# Patient Record
Sex: MALE | Race: WHITE
[De-identification: names, ages, dates, MRNs, and addresses within clinical notes are randomized per-mention and may not be internally consistent; named-entity substitution may affect disease eponyms.]

---

## 2018-07-16 ENCOUNTER — TRANSCRIPTION ENCOUNTER (OUTPATIENT)
Age: 48
End: 2018-07-16

## 2018-12-18 PROBLEM — Z00.00 ENCOUNTER FOR PREVENTIVE HEALTH EXAMINATION: Status: ACTIVE | Noted: 2018-12-18

## 2018-12-19 ENCOUNTER — APPOINTMENT (OUTPATIENT)
Dept: GASTROENTEROLOGY | Facility: CLINIC | Age: 48
End: 2018-12-19
Payer: COMMERCIAL

## 2018-12-19 VITALS
WEIGHT: 175 LBS | SYSTOLIC BLOOD PRESSURE: 118 MMHG | HEIGHT: 69 IN | BODY MASS INDEX: 25.92 KG/M2 | DIASTOLIC BLOOD PRESSURE: 64 MMHG | HEART RATE: 70 BPM

## 2018-12-19 DIAGNOSIS — Z83.79 FAMILY HISTORY OF OTHER DISEASES OF THE DIGESTIVE SYSTEM: ICD-10-CM

## 2018-12-19 DIAGNOSIS — Z87.891 PERSONAL HISTORY OF NICOTINE DEPENDENCE: ICD-10-CM

## 2018-12-19 PROCEDURE — 99214 OFFICE O/P EST MOD 30 MIN: CPT

## 2018-12-19 RX ORDER — OMEPRAZOLE 20 MG/1
20 TABLET, DELAYED RELEASE ORAL
Refills: 0 | Status: ACTIVE | COMMUNITY

## 2019-02-27 ENCOUNTER — INBOUND DOCUMENT (OUTPATIENT)
Age: 49
End: 2019-02-27

## 2019-03-07 ENCOUNTER — RECORD ABSTRACTING (OUTPATIENT)
Age: 49
End: 2019-03-07

## 2019-03-07 DIAGNOSIS — Z80.8 FAMILY HISTORY OF MALIGNANT NEOPLASM OF OTHER ORGANS OR SYSTEMS: ICD-10-CM

## 2019-03-07 DIAGNOSIS — L27.0 GENERALIZED SKIN ERUPTION DUE TO DRUGS AND MEDICAMENTS TAKEN INTERNALLY: ICD-10-CM

## 2019-03-07 DIAGNOSIS — Z79.899 OTHER LONG TERM (CURRENT) DRUG THERAPY: ICD-10-CM

## 2019-03-07 DIAGNOSIS — I82.629 ACUTE EMBOLISM AND THROMBOSIS OF DEEP VEINS OF UNSPECIFIED UPPER EXTREMITY: ICD-10-CM

## 2019-03-07 RX ORDER — OMEGA-3/DHA/EPA/FISH OIL 300-1000MG
1000 CAPSULE ORAL
Refills: 0 | Status: ACTIVE | COMMUNITY

## 2019-03-21 ENCOUNTER — APPOINTMENT (OUTPATIENT)
Dept: GASTROENTEROLOGY | Facility: HOSPITAL | Age: 49
End: 2019-03-21

## 2019-03-23 ENCOUNTER — RX RENEWAL (OUTPATIENT)
Age: 49
End: 2019-03-23

## 2019-07-01 ENCOUNTER — RX RENEWAL (OUTPATIENT)
Age: 49
End: 2019-07-01

## 2020-03-30 ENCOUNTER — RX RENEWAL (OUTPATIENT)
Age: 50
End: 2020-03-30

## 2020-12-07 ENCOUNTER — TRANSCRIPTION ENCOUNTER (OUTPATIENT)
Age: 50
End: 2020-12-07

## 2021-03-05 ENCOUNTER — TRANSCRIPTION ENCOUNTER (OUTPATIENT)
Age: 51
End: 2021-03-05

## 2021-04-15 ENCOUNTER — TRANSCRIPTION ENCOUNTER (OUTPATIENT)
Age: 51
End: 2021-04-15

## 2021-04-15 ENCOUNTER — RX RENEWAL (OUTPATIENT)
Age: 51
End: 2021-04-15

## 2021-05-10 ENCOUNTER — NON-APPOINTMENT (OUTPATIENT)
Age: 51
End: 2021-05-10

## 2021-05-10 ENCOUNTER — APPOINTMENT (OUTPATIENT)
Dept: GASTROENTEROLOGY | Facility: CLINIC | Age: 51
End: 2021-05-10
Payer: COMMERCIAL

## 2021-05-10 VITALS
HEART RATE: 61 BPM | TEMPERATURE: 96.8 F | DIASTOLIC BLOOD PRESSURE: 62 MMHG | WEIGHT: 173 LBS | HEIGHT: 69 IN | SYSTOLIC BLOOD PRESSURE: 104 MMHG | BODY MASS INDEX: 25.62 KG/M2

## 2021-05-10 PROCEDURE — 99072 ADDL SUPL MATRL&STAF TM PHE: CPT

## 2021-05-10 PROCEDURE — 99214 OFFICE O/P EST MOD 30 MIN: CPT | Mod: 25

## 2021-05-10 PROCEDURE — 36415 COLL VENOUS BLD VENIPUNCTURE: CPT

## 2021-05-10 RX ORDER — MESALAMINE 4 G/60ML
4 SUSPENSION RECTAL DAILY
Qty: 30 | Refills: 5 | Status: DISCONTINUED | COMMUNITY
Start: 2018-12-19 | End: 2021-05-10

## 2021-05-10 RX ORDER — OMEGA-3/DHA/EPA/FISH OIL 300-1000MG
CAPSULE ORAL
Refills: 0 | Status: ACTIVE | COMMUNITY

## 2021-05-10 RX ORDER — OMEPRAZOLE 40 MG/1
40 CAPSULE, DELAYED RELEASE ORAL
Refills: 0 | Status: DISCONTINUED | COMMUNITY
End: 2021-05-10

## 2021-05-10 RX ORDER — ASCORBIC ACID 500 MG
TABLET ORAL
Refills: 0 | Status: ACTIVE | COMMUNITY

## 2021-05-10 RX ORDER — FAMOTIDINE 40 MG/1
TABLET, FILM COATED ORAL
Refills: 0 | Status: DISCONTINUED | COMMUNITY
End: 2021-05-10

## 2021-05-10 RX ORDER — CHOLECALCIFEROL (VITAMIN D3) 25 MCG
TABLET ORAL
Refills: 0 | Status: ACTIVE | COMMUNITY

## 2021-05-10 RX ORDER — HYDROCORTISONE 100 MG/60ML
100 ENEMA RECTAL
Refills: 0 | Status: DISCONTINUED | COMMUNITY
End: 2021-05-10

## 2021-05-10 NOTE — ASSESSMENT
[FreeTextEntry1] : 1.  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 We are doing of full lab work now.\par 5. I am asking him also to followup with Dr. Pankaj Butler\par #6 The patienthas only been on omeprazole for his reflux currently\par 7. He has a history of being on 6-MP for 7 years and did very well in no 7 years and has done fairly well since that time. Currently he is on no medications for his Crohn's8. Upwe will set up a colonoscopy and EGD hopefully within the next 3 or 4 weeks\par 9.  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  THis coordination of care may have included reviewing other medical notes and reports, and communicating with other health professionals\par \par AS WE OBTAIN INFORMED CONSENT FOR COLONOSCOPY, UPPER ENDOSCOPY [EGD], OR BOTH PROCEDURES TOGETHER;\par \par As with all procedures, there are risks of which the patient should be aware\par \par 1.  Anesthesia; deep sedation with Propofol;  there is a small risk of aspiration and pulmonary infection.  The Anesthesiologist meets with the patient the morning of the procedure to discuss in more detail\par \par 2.  risk of bleeding; from removal of a polyp, or rarely, from biopsies, 1 % or less\par \par 3.  risk of injury or perforation of the colon or upper GI tract; one in a thousand or less,  from removing a polyp or from advancing the instrument\par \par \par

## 2021-05-10 NOTE — PHYSICAL EXAM
[General Appearance - Alert] : alert [General Appearance - In No Acute Distress] : in no acute distress [Sclera] : the sclera and conjunctiva were normal [PERRL With Normal Accommodation] : pupils were equal in size, round, and reactive to light [Extraocular Movements] : extraocular movements were intact [Heart Rate And Rhythm] : heart rate was normal and rhythm regular [Heart Sounds] : normal S1 and S2 [Heart Sounds Gallop] : no gallops [Murmurs] : no murmurs [Heart Sounds Pericardial Friction Rub] : no pericardial rub [Full Pulse] : the pedal pulses are present [Edema] : there was no peripheral edema [Bowel Sounds] : normal bowel sounds [Abdomen Soft] : soft [Abdomen Tenderness] : non-tender [] : no hepato-splenomegaly [Abdomen Mass (___ Cm)] : no abdominal mass palpated [FreeTextEntry1] : deferred to colonoscopy [Oriented To Time, Place, And Person] : oriented to person, place, and time [Impaired Insight] : insight and judgment were intact [Affect] : the affect was normal

## 2021-05-10 NOTE — HISTORY OF PRESENT ILLNESS
[FreeTextEntry1] : \par In the officeappointment,established patient,Crohn's diseaselarge and small intestine\par First visi since approximately March 2019. At that time he had colonoscopy.\par \par There is long-standing Crohn's, and patient took 6 mercaptopurine for 7 years from [id number] 2017 along with Pentasa.\par \par He had excellent control of his Crohn's while he was on6 mercaptopurine\par he had poor control for the years prior to treatment with 6-MP and he required prednisone on several occasions.\par We have found that the biopsies on several occasions showed more inflammationthan did his Crohn's disease of symptoms which indicated reasonably good control.\par \par Even now he says that his bowel movements tend to be normal and under control and his GI symptoms are under control except when he deviates from diet, and seems to be sensitive particularly to leg wounds, Prevacid, dairy products which can give him cramps gas and diarrhea even the next day\par He has considerable esophageal reflux, and is on long-term omeprazole with excellent control, but prompt return of his reflux symptoms should he go off the omeprazoleeven for oneday.\par On his last colonoscopy March 22, 2019 he had inflammation in the rectum, terminal ileum was normal, chronic minimal changes in the cecum, suffusion of the vascular pattern andscarring end ofsome atrophy in the ascending colon, a 1.2 cm sessile polyp in the ascending colon which was removed but proved to be a pseudopolyp, and another pseudopolyp that was removed as well thinking it could be adenomatous.\par \par There was considerable scarring in the transverse colon tethering of the mucosa and loss of submucosal vascular pattern. Chronic disease in the transverse colon but not much acute disease if any\par \par Areas of scarring in the rectum and the sigmoid was normal. Chronic changes in the descending colon.\par \par Therefore he hassome pseudopolyps in the left colon, considerable chronic changes on the colon on his last colonoscopy, and 2 polyps which were removed which ultimately proved to be pseudopolyps.\par \par He has not had EGD of probably for 5 years\par \par In March 2018, when he was having more considerable inflammation, I needed to treat him with prednisone for a short period of time and tapered quickly.\par \par That was for a flareup of symptoms\par Please note also a history in 2014 of a thrombosis of the left subclavian vein which was treated with 6 months of colic was very he has not had any recurrent symptoms of thrombosis and I wonder if this was due to the hypervascularity of Crohn's disease. Again this has not occurred since that time.

## 2021-05-11 LAB
25(OH)D3 SERPL-MCNC: 35.5 NG/ML
ALBUMIN SERPL ELPH-MCNC: 4.4 G/DL
ALP BLD-CCNC: 71 U/L
ALT SERPL-CCNC: 23 U/L
ANION GAP SERPL CALC-SCNC: 10 MMOL/L
AST SERPL-CCNC: 21 U/L
BASOPHILS # BLD AUTO: 0.04 K/UL
BASOPHILS NFR BLD AUTO: 0.9 %
BILIRUB SERPL-MCNC: 0.2 MG/DL
BUN SERPL-MCNC: 20 MG/DL
CALCIUM SERPL-MCNC: 9.1 MG/DL
CHLORIDE SERPL-SCNC: 103 MMOL/L
CO2 SERPL-SCNC: 28 MMOL/L
CREAT SERPL-MCNC: 1.09 MG/DL
CRP SERPL-MCNC: <3 MG/L
EOSINOPHIL # BLD AUTO: 0.29 K/UL
EOSINOPHIL NFR BLD AUTO: 6.5 %
ERYTHROCYTE [SEDIMENTATION RATE] IN BLOOD BY WESTERGREN METHOD: 9 MM/HR
GLUCOSE SERPL-MCNC: 100 MG/DL
HCT VFR BLD CALC: 43 %
HGB BLD-MCNC: 14.2 G/DL
IMM GRANULOCYTES NFR BLD AUTO: 0.2 %
LYMPHOCYTES # BLD AUTO: 1.42 K/UL
LYMPHOCYTES NFR BLD AUTO: 31.7 %
MAN DIFF?: NORMAL
MCHC RBC-ENTMCNC: 31.3 PG
MCHC RBC-ENTMCNC: 33 GM/DL
MCV RBC AUTO: 94.7 FL
MONOCYTES # BLD AUTO: 0.42 K/UL
MONOCYTES NFR BLD AUTO: 9.4 %
NEUTROPHILS # BLD AUTO: 2.3 K/UL
NEUTROPHILS NFR BLD AUTO: 51.3 %
PLATELET # BLD AUTO: 235 K/UL
POTASSIUM SERPL-SCNC: 4.6 MMOL/L
PROT SERPL-MCNC: 6.4 G/DL
RBC # BLD: 4.54 M/UL
RBC # FLD: 12.4 %
SODIUM SERPL-SCNC: 140 MMOL/L
VIT B12 SERPL-MCNC: 905 PG/ML
WBC # FLD AUTO: 4.48 K/UL

## 2021-05-16 ENCOUNTER — RX RENEWAL (OUTPATIENT)
Age: 51
End: 2021-05-16

## 2021-06-09 ENCOUNTER — RX RENEWAL (OUTPATIENT)
Age: 51
End: 2021-06-09

## 2021-06-09 RX ORDER — OMEPRAZOLE 20 MG/1
20 CAPSULE, DELAYED RELEASE ORAL DAILY
Qty: 30 | Refills: 0 | Status: ACTIVE | COMMUNITY
Start: 2018-12-19 | End: 1900-01-01

## 2021-06-16 RX ORDER — OMEPRAZOLE 20 MG/1
20 CAPSULE, DELAYED RELEASE ORAL
Qty: 90 | Refills: 3 | Status: ACTIVE | COMMUNITY
Start: 2021-06-16 | End: 1900-01-01

## 2021-06-20 ENCOUNTER — RESULT REVIEW (OUTPATIENT)
Age: 51
End: 2021-06-20

## 2021-06-22 ENCOUNTER — RESULT REVIEW (OUTPATIENT)
Age: 51
End: 2021-06-22

## 2021-06-23 ENCOUNTER — APPOINTMENT (OUTPATIENT)
Dept: GASTROENTEROLOGY | Facility: HOSPITAL | Age: 51
End: 2021-06-23

## 2021-06-29 ENCOUNTER — TRANSCRIPTION ENCOUNTER (OUTPATIENT)
Age: 51
End: 2021-06-29

## 2021-08-29 ENCOUNTER — TRANSCRIPTION ENCOUNTER (OUTPATIENT)
Age: 51
End: 2021-08-29

## 2021-09-16 ENCOUNTER — TRANSCRIPTION ENCOUNTER (OUTPATIENT)
Age: 51
End: 2021-09-16

## 2021-09-28 ENCOUNTER — APPOINTMENT (OUTPATIENT)
Dept: GASTROENTEROLOGY | Facility: CLINIC | Age: 51
End: 2021-09-28
Payer: COMMERCIAL

## 2021-09-28 VITALS — WEIGHT: 176 LBS | BODY MASS INDEX: 25.99 KG/M2

## 2021-09-28 VITALS
TEMPERATURE: 97.4 F | DIASTOLIC BLOOD PRESSURE: 62 MMHG | HEIGHT: 69 IN | HEART RATE: 60 BPM | SYSTOLIC BLOOD PRESSURE: 114 MMHG

## 2021-09-28 PROCEDURE — 99214 OFFICE O/P EST MOD 30 MIN: CPT

## 2021-09-28 RX ORDER — DOXYCYCLINE HYCLATE 100 MG/1
100 TABLET ORAL
Refills: 0 | Status: ACTIVE | COMMUNITY

## 2021-09-28 RX ORDER — MESALAMINE 1.2 G/1
1.2 TABLET, DELAYED RELEASE ORAL
Refills: 0 | Status: DISCONTINUED | COMMUNITY
End: 2021-09-28

## 2021-09-28 RX ORDER — MESALAMINE 4 G/60ML
4 SUSPENSION RECTAL
Refills: 0 | Status: DISCONTINUED | COMMUNITY
End: 2021-09-28

## 2021-09-28 RX ORDER — PREDNISONE 10 MG/1
10 TABLET ORAL
Refills: 0 | Status: DISCONTINUED | COMMUNITY
End: 2021-09-28

## 2021-09-28 NOTE — HISTORY OF PRESENT ILLNESS
[FreeTextEntry1] : 1 Crohns disease..\par see note of June 23d 2021\par \par having normal bowel function, he feels well, his bowel movements are pretty regular, normal no blood\par \par colonoscopy performed on june 23d 2021; revealed the following\par \par crohns colitis in remission, without active or acute changes, but there are features of chronic dx, particularly in the ascending and transverse colon.\par \par the chronic changes consisted os some scarring, and retraction of mucosa, in ascending and transverse colon\par \par patient also had egd on the same day;\par nl exam, but bx taken at [facility], no confirm of barretts esophagus\par \par all in all, this was excellent results\par \par only medication;\par Omeprazole 20 mg every morning, which works\par \par i had asked Kaveh to make a fu appointment to give us time to discuss issues \par of small bowel imaging\par of obtaining fecal calprotectin\par of discussing all immunizations needed\par \par

## 2021-09-28 NOTE — ASSESSMENT
[FreeTextEntry1] : Re Immunizations;\par \par 1.  i advise the two stage immun, for Pneumococcal pneumonia\par all of our patients with IBD are advised to have this especially as they reach 50 y o.\par \par 2.  has not had Covid..so i am urging the COvid, he is hesitant\par \par 3.  has not had Shingles vaccine\par \par 4.  his child are in sixth and first grade\par \par 5.  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

## 2021-10-04 ENCOUNTER — TRANSCRIPTION ENCOUNTER (OUTPATIENT)
Age: 51
End: 2021-10-04

## 2022-03-18 ENCOUNTER — APPOINTMENT (OUTPATIENT)
Dept: GASTROENTEROLOGY | Facility: CLINIC | Age: 52
End: 2022-03-18
Payer: COMMERCIAL

## 2022-03-18 DIAGNOSIS — D68.69 OTHER THROMBOPHILIA: ICD-10-CM

## 2022-03-18 DIAGNOSIS — K21.9 GASTRO-ESOPHAGEAL REFLUX DISEASE W/OUT ESOPHAGITIS: ICD-10-CM

## 2022-03-18 DIAGNOSIS — K50.90 CROHN'S DISEASE, UNSPECIFIED, W/OUT COMPLICATIONS: ICD-10-CM

## 2022-03-18 DIAGNOSIS — K50.819 CROHN'S DISEASE OF BOTH SMALL AND LARGE INTESTINE WITH UNSPECIFIED COMPLICATIONS: ICD-10-CM

## 2022-03-18 PROCEDURE — 99213 OFFICE O/P EST LOW 20 MIN: CPT | Mod: 95

## 2022-03-18 NOTE — ASSESSMENT
[FreeTextEntry1] : 1.  going forward, of course, we do not intend to begin meds when Kaveh is in clinical and endoscopi remission\par 2.  i did feel that lab work when next done, with dr Ailyn Butler, should include an esr and a non cardiac CRP\par 3.  i also discussed with Kaveh the value of a baseline fecal calprotectin, because of its value in screening for activity should symptoms appear, and even on a semi regular basis.\par 4.  Kaveh and I had a long discussion last year about vaccine practises with Crohns, and i heard Jc reservations i believe quite well as he articulated them well\par let s see what kind of discussion dr Butler has with Kaveh\par \par My first choice would be pneumonia vaccine, flu vaccines within season, and perhaps a Shingles or Shingrix vaccine.\par \par overall, i am very pleased\par but i do advise Kaveh to have fu colonoscopy with long standing disease every 12-15 months\par \par Kaveh and Nancy will coordinate getting the container for the fecal calpro, and JOHANNA will give Madalyn copy of todays note.\par More than 50% of the face to face time was devoted to counseling and /or coordination of care.  THis coordination of care may have included reviewing other medical notes and reports, and communicating with other health professionals\par

## 2022-10-31 ENCOUNTER — NON-APPOINTMENT (OUTPATIENT)
Age: 52
End: 2022-10-31

## 2023-09-08 ENCOUNTER — NON-APPOINTMENT (OUTPATIENT)
Age: 53
End: 2023-09-08

## 2025-08-22 ENCOUNTER — LABORATORY RESULT (OUTPATIENT)
Age: 55
End: 2025-08-22

## 2025-08-22 ENCOUNTER — APPOINTMENT (OUTPATIENT)
Dept: GASTROENTEROLOGY | Facility: CLINIC | Age: 55
End: 2025-08-22
Payer: COMMERCIAL

## 2025-08-22 DIAGNOSIS — K62.5 HEMORRHAGE OF ANUS AND RECTUM: ICD-10-CM

## 2025-08-22 DIAGNOSIS — D68.69 OTHER THROMBOPHILIA: ICD-10-CM

## 2025-08-22 DIAGNOSIS — K50.819 CROHN'S DISEASE OF BOTH SMALL AND LARGE INTESTINE WITH UNSPECIFIED COMPLICATIONS: ICD-10-CM

## 2025-08-22 PROCEDURE — 99204 OFFICE O/P NEW MOD 45 MIN: CPT

## 2025-08-22 RX ORDER — MESALAMINE 1000 MG/1
1000 SUPPOSITORY RECTAL
Qty: 30 | Refills: 5 | Status: ACTIVE | COMMUNITY
Start: 2025-08-22 | End: 1900-01-01

## 2025-08-22 RX ORDER — SODIUM PICOSULFATE, MAGNESIUM OXIDE, AND ANHYDROUS CITRIC ACID 12; 3.5; 1 G/175ML; G/175ML; MG/175ML
10-3.5-12 MG-GM LIQUID ORAL
Qty: 2 | Refills: 1 | Status: ACTIVE | COMMUNITY
Start: 2025-08-22 | End: 1900-01-01

## 2025-08-23 LAB
ALBUMIN SERPL ELPH-MCNC: 4.3 G/DL
ALP BLD-CCNC: 63 U/L
ALT SERPL-CCNC: 62 U/L
ANION GAP SERPL CALC-SCNC: 12 MMOL/L
AST SERPL-CCNC: 48 U/L
BILIRUB SERPL-MCNC: 0.3 MG/DL
BUN SERPL-MCNC: 29 MG/DL
CALCIUM SERPL-MCNC: 9.4 MG/DL
CHLORIDE SERPL-SCNC: 101 MMOL/L
CO2 SERPL-SCNC: 28 MMOL/L
CREAT SERPL-MCNC: 1.25 MG/DL
CRP SERPL-MCNC: <3 MG/L
EGFRCR SERPLBLD CKD-EPI 2021: 68 ML/MIN/1.73M2
ERYTHROCYTE [SEDIMENTATION RATE] IN BLOOD BY WESTERGREN METHOD: 3 MM/HR
GLUCOSE SERPL-MCNC: 101 MG/DL
HCT VFR BLD CALC: 39.8 %
HGB BLD-MCNC: 13.5 G/DL
MCHC RBC-ENTMCNC: 30.9 PG
MCHC RBC-ENTMCNC: 33.9 G/DL
MCV RBC AUTO: 91.1 FL
PLATELET # BLD AUTO: 237 K/UL
POTASSIUM SERPL-SCNC: 4.1 MMOL/L
PROT SERPL-MCNC: 6.4 G/DL
RBC # BLD: 4.37 M/UL
RBC # FLD: 13.3 %
SODIUM SERPL-SCNC: 141 MMOL/L
VIT B12 SERPL-MCNC: 1495 PG/ML
WBC # FLD AUTO: 6.18 K/UL

## 2025-08-27 ENCOUNTER — NON-APPOINTMENT (OUTPATIENT)
Age: 55
End: 2025-08-27

## 2025-08-27 LAB
BAKER'S YEAST AB QL: 23.4 UNITS
BAKER'S YEAST IGA QL IA: 22 UNITS
BAKER'S YEAST IGA QN IA: ABNORMAL
BAKER'S YEAST IGG QN IA: ABNORMAL

## 2025-09-11 ENCOUNTER — RESULT REVIEW (OUTPATIENT)
Age: 55
End: 2025-09-11

## 2025-09-11 ENCOUNTER — TRANSCRIPTION ENCOUNTER (OUTPATIENT)
Age: 55
End: 2025-09-11

## 2025-09-11 ENCOUNTER — APPOINTMENT (OUTPATIENT)
Dept: GASTROENTEROLOGY | Facility: HOSPITAL | Age: 55
End: 2025-09-11